# Patient Record
Sex: FEMALE | Race: WHITE | URBAN - METROPOLITAN AREA
[De-identification: names, ages, dates, MRNs, and addresses within clinical notes are randomized per-mention and may not be internally consistent; named-entity substitution may affect disease eponyms.]

---

## 2023-04-12 ENCOUNTER — OFFICE VISIT (OUTPATIENT)
Dept: URBAN - METROPOLITAN AREA CLINIC 54 | Facility: CLINIC | Age: 53
End: 2023-04-12
Payer: COMMERCIAL

## 2023-04-12 DIAGNOSIS — H44.23 DEGENERATIVE MYOPIA, BILATERAL: Primary | ICD-10-CM

## 2023-04-12 DIAGNOSIS — H43.823 VITREOMACULAR ADHESION, BILATERAL: ICD-10-CM

## 2023-04-12 PROCEDURE — 92134 CPTRZ OPH DX IMG PST SGM RTA: CPT | Performed by: OPHTHALMOLOGY

## 2023-04-12 PROCEDURE — 99203 OFFICE O/P NEW LOW 30 MIN: CPT | Performed by: OPHTHALMOLOGY

## 2023-04-12 ASSESSMENT — INTRAOCULAR PRESSURE
OD: 15
OS: 16

## 2023-04-12 NOTE — IMPRESSION/PLAN
Impression: Degenerative myopia, bilateral: H44.23. Plan: She is a high myope (-9.0) and her for eval prior to TOMEKA. Exam is unremarkable and OCT confirma VMA but no traction/CME/SRF OU. There is no retinal contraindication to her upcoming cataract surgery. RD signs discussed. I am happy to see her again anytime. thanks Ashley Quinonez RTC PRN Prior notes from other provider(s) have been reviewed in conjunction with today's visit.

## 2023-04-12 NOTE — IMPRESSION/PLAN
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 Plan: OCT shows VMA but no traction.  observe